# Patient Record
Sex: FEMALE | Race: BLACK OR AFRICAN AMERICAN
[De-identification: names, ages, dates, MRNs, and addresses within clinical notes are randomized per-mention and may not be internally consistent; named-entity substitution may affect disease eponyms.]

---

## 2018-10-10 ENCOUNTER — HOSPITAL ENCOUNTER (EMERGENCY)
Dept: HOSPITAL 87 - ER | Age: 23
LOS: 2 days | Discharge: HOME | End: 2018-10-12
Payer: MEDICAID

## 2018-10-10 VITALS — BODY MASS INDEX: 31.59 KG/M2 | HEIGHT: 65 IN | WEIGHT: 189.6 LBS

## 2018-10-10 DIAGNOSIS — R45.851: ICD-10-CM

## 2018-10-10 DIAGNOSIS — F32.9: Primary | ICD-10-CM

## 2018-10-10 DIAGNOSIS — R74.0: ICD-10-CM

## 2018-10-10 DIAGNOSIS — Z87.891: ICD-10-CM

## 2018-10-10 PROCEDURE — 99283 EMERGENCY DEPT VISIT LOW MDM: CPT

## 2018-10-10 PROCEDURE — 81025 URINE PREGNANCY TEST: CPT

## 2018-10-11 LAB
AMPHETAMINES UR QL SCN: NEGATIVE
APPEARANCE UR: (no result)
BARBITURATES UR QL SCN: NEGATIVE
BASOPHILS NFR BLD AUTO: 1 % (ref 0–2)
BENZODIAZ UR QL SCN: NEGATIVE
BZE UR QL SCN: NEGATIVE
CANNABINOIDS UR QL SCN: NEGATIVE
CHLORIDE SERPL-SCNC: 105 MEQ/L (ref 98–107)
COLOR UR: YELLOW
EOSINOPHIL NFR BLD AUTO: 1.8 % (ref 0–5)
ERYTHROCYTE [DISTWIDTH] IN BLOOD BY AUTOMATED COUNT: 14.1 % (ref 11.6–14.6)
ETHANOL SERPL-MCNC: < 10 MG/DL
HCG SERPL QL: NEGATIVE
HCT VFR BLD AUTO: 36.9 % (ref 36–48)
HGB BLD-MCNC: 12.7 G/DL (ref 12–16)
HGB UR QL STRIP: (no result)
KETONES UR STRIP-MCNC: NEGATIVE MG/DL
LEUKOCYTE ESTERASE UR QL STRIP: (no result)
LYMPHOCYTES NFR BLD AUTO: 38.1 % (ref 20–50)
MCH RBC QN AUTO: 29.4 PG (ref 28–32)
MCV RBC AUTO: 85.4 FL (ref 81–99)
METHADONE UR QL SCN: NEGATIVE
MONOCYTES NFR BLD AUTO: 7.5 % (ref 2–8)
NEUTROPHILS NFR BLD AUTO: 51.6 % (ref 40–76)
NITRITE UR QL STRIP: NEGATIVE
OPIATES UR QL SCN: NEGATIVE
PCP UR QL SCN: NEGATIVE
PH UR STRIP: 7.5 [PH] (ref 4.5–8)
PLATELET # BLD AUTO: 328 X1000/UL (ref 130–400)
PMV BLD AUTO: 8.1 FL (ref 7.4–10.4)
PROT UR QL STRIP: NEGATIVE
RBC # BLD AUTO: 4.32 MILL/UL (ref 4.2–5.4)
SP GR UR STRIP: 1 (ref 1–1.03)
UROBILINOGEN UR STRIP-MCNC: 0.2 E.U./DL (ref 0.2–1)

## 2018-10-11 RX ADMIN — NITROFURANTOIN MONOHYDRATE AND NITROFURANTOIN MACROCRYSTALLINE SCH MG: 75; 25 CAPSULE ORAL at 21:00

## 2018-10-12 VITALS — DIASTOLIC BLOOD PRESSURE: 72 MMHG | SYSTOLIC BLOOD PRESSURE: 124 MMHG

## 2018-10-12 RX ADMIN — NITROFURANTOIN MONOHYDRATE AND NITROFURANTOIN MACROCRYSTALLINE SCH MG: 75; 25 CAPSULE ORAL at 09:36

## 2018-10-31 ENCOUNTER — HOSPITAL ENCOUNTER (EMERGENCY)
Dept: HOSPITAL 12 - ER | Age: 23
Discharge: HOME | End: 2018-10-31
Payer: COMMERCIAL

## 2018-10-31 VITALS — HEIGHT: 65 IN | WEIGHT: 200 LBS | BODY MASS INDEX: 33.32 KG/M2

## 2018-10-31 VITALS — SYSTOLIC BLOOD PRESSURE: 121 MMHG | DIASTOLIC BLOOD PRESSURE: 71 MMHG

## 2018-10-31 DIAGNOSIS — F20.9: Primary | ICD-10-CM

## 2018-10-31 DIAGNOSIS — F17.200: ICD-10-CM

## 2018-10-31 DIAGNOSIS — Z59.0: ICD-10-CM

## 2018-10-31 PROCEDURE — A4663 DIALYSIS BLOOD PRESSURE CUFF: HCPCS

## 2018-10-31 SDOH — ECONOMIC STABILITY - HOUSING INSECURITY: HOMELESSNESS: Z59.0

## 2018-10-31 NOTE — NUR
Pt ambulates to ER accompanied by [employer] with c/o chronic rectal pain.  [id number].  
Pt states she hears voices but the voices tell her jokes.  Denies SI/HI.  VSS.

## 2018-10-31 NOTE — NUR
Patient discharged to home in stable conditon.  Written and verbal after care 
instructions given. 

Patient verbalizes understanding of instructions.  Pt ambulates out of ER in 
steady gait.  All belongings with pt.  NAD noted. VSS.

## 2018-11-16 ENCOUNTER — HOSPITAL ENCOUNTER (EMERGENCY)
Dept: HOSPITAL 91 - E/R | Age: 23
Discharge: LEFT BEFORE DISCHARGE | End: 2018-11-16
Payer: SELF-PAY

## 2018-11-16 ENCOUNTER — HOSPITAL ENCOUNTER (EMERGENCY)
Age: 23
Discharge: LEFT BEFORE DISCHARGE | End: 2018-11-16

## 2018-11-16 DIAGNOSIS — Z53.21: Primary | ICD-10-CM

## 2019-01-05 ENCOUNTER — HOSPITAL ENCOUNTER (EMERGENCY)
Dept: HOSPITAL 91 - E/R | Age: 24
Discharge: LEFT BEFORE DISCHARGE | End: 2019-01-05
Payer: SELF-PAY

## 2019-01-05 ENCOUNTER — HOSPITAL ENCOUNTER (EMERGENCY)
Dept: HOSPITAL 91 - E/R | Age: 24
Discharge: HOME | End: 2019-01-05
Payer: COMMERCIAL

## 2019-01-05 DIAGNOSIS — R40.2362: ICD-10-CM

## 2019-01-05 DIAGNOSIS — Z53.21: Primary | ICD-10-CM

## 2019-01-05 DIAGNOSIS — R40.2252: ICD-10-CM

## 2019-01-05 DIAGNOSIS — R40.2142: ICD-10-CM

## 2019-01-05 DIAGNOSIS — R19.7: Primary | ICD-10-CM

## 2019-01-05 LAB
ACETAMINOPHEN: < 10 UG/ML (ref 10–30)
ADD MAN DIFF?: NO
ALANINE AMINOTRANSFERASE: 45 IU/L (ref 13–69)
ALBUMIN/GLOBULIN RATIO: 1.17
ALBUMIN: 4.1 G/DL (ref 3.3–4.9)
ALKALINE PHOSPHATASE: 75 IU/L (ref 42–121)
ANION GAP: 10 (ref 5–13)
ASPARTATE AMINO TRANSFERASE: 51 IU/L (ref 15–46)
BASOPHIL #: 0 10^3/UL (ref 0–0.1)
BASOPHILS %: 0.1 % (ref 0–2)
BILIRUBIN,DIRECT: 0 MG/DL (ref 0–0.2)
BILIRUBIN,TOTAL: 0 MG/DL (ref 0.2–1.3)
BLOOD UREA NITROGEN: 9 MG/DL (ref 7–20)
CALCIUM: 9.2 MG/DL (ref 8.4–10.2)
CARBON DIOXIDE: 27 MMOL/L (ref 21–31)
CHLORIDE: 108 MMOL/L (ref 97–110)
CREATININE: 0.51 MG/DL (ref 0.44–1)
EOSINOPHILS #: 0.2 10^3/UL (ref 0–0.5)
EOSINOPHILS %: 1.6 % (ref 0–7)
ETHANOL: < 10 MG/DL (ref 0–0)
GLOBULIN: 3.5 G/DL (ref 1.3–3.2)
GLUCOSE: 99 MG/DL (ref 70–220)
HEMATOCRIT: 33.5 % (ref 37–47)
HEMOGLOBIN: 10.8 G/DL (ref 12–16)
IMMATURE GRANS #M: 0.04 10^3/UL (ref 0–0.03)
IMMATURE GRANS % (M): 0.3 % (ref 0–0.43)
LYMPHOCYTES #: 2.5 10^3/UL (ref 0.8–2.9)
LYMPHOCYTES %: 17.8 % (ref 15–51)
MEAN CORPUSCULAR HEMOGLOBIN: 27.7 PG (ref 29–33)
MEAN CORPUSCULAR HGB CONC: 32.2 G/DL (ref 32–37)
MEAN CORPUSCULAR VOLUME: 85.9 FL (ref 82–101)
MEAN PLATELET VOLUME: 8.8 FL (ref 7.4–10.4)
MONOCYTE #: 0.8 10^3/UL (ref 0.3–0.9)
MONOCYTES %: 5.8 % (ref 0–11)
NEUTROPHIL #: 10.4 10^3/UL (ref 1.6–7.5)
NEUTROPHILS %: 74.4 % (ref 39–77)
NUCLEATED RED BLOOD CELLS #: 0 10^3/UL (ref 0–0)
NUCLEATED RED BLOOD CELLS%: 0 /100WBC (ref 0–0)
PLATELET COUNT: 427 10^3/UL (ref 140–415)
POTASSIUM: 3.8 MMOL/L (ref 3.5–5.1)
RED BLOOD COUNT: 3.9 10^6/UL (ref 4.2–5.4)
RED CELL DISTRIBUTION WIDTH: 13.2 % (ref 11.5–14.5)
SALICYLATE: < 1 MG/DL (ref 5–30)
SODIUM: 141 MMOL/L (ref 135–144)
TOTAL PROTEIN: 7.6 G/DL (ref 6.1–8.1)
WHITE BLOOD COUNT: 14 10^3/UL (ref 4.8–10.8)

## 2019-01-05 PROCEDURE — 80307 DRUG TEST PRSMV CHEM ANLYZR: CPT

## 2019-01-05 PROCEDURE — 85025 COMPLETE CBC W/AUTO DIFF WBC: CPT

## 2019-01-05 PROCEDURE — 99284 EMERGENCY DEPT VISIT MOD MDM: CPT

## 2019-01-05 PROCEDURE — 80053 COMPREHEN METABOLIC PANEL: CPT

## 2019-01-05 RX ADMIN — THIAMINE HYDROCHLORIDE 1 MLS/HR: 100 INJECTION, SOLUTION INTRAMUSCULAR; INTRAVENOUS at 06:15

## 2019-01-05 RX ADMIN — CIPROFLOXACIN HYDROCHLORIDE 1 MG: 500 TABLET, FILM COATED ORAL at 07:41

## 2021-07-09 ENCOUNTER — HOSPITAL ENCOUNTER (EMERGENCY)
Dept: HOSPITAL 54 - ER | Age: 26
Discharge: HOME | End: 2021-07-09
Payer: COMMERCIAL

## 2021-07-09 VITALS — DIASTOLIC BLOOD PRESSURE: 73 MMHG | SYSTOLIC BLOOD PRESSURE: 124 MMHG

## 2021-07-09 VITALS — BODY MASS INDEX: 23.95 KG/M2 | WEIGHT: 149 LBS | HEIGHT: 66 IN

## 2021-07-09 DIAGNOSIS — Z76.0: ICD-10-CM

## 2021-07-09 DIAGNOSIS — F20.0: ICD-10-CM

## 2021-07-09 DIAGNOSIS — F31.9: Primary | ICD-10-CM

## 2021-07-09 RX ADMIN — QUETIAPINE FUMARATE ONE MG: 100 TABLET ORAL at 02:30

## 2021-07-09 RX ADMIN — QUETIAPINE FUMARATE SCH MG: 100 TABLET ORAL at 02:30

## 2021-07-09 RX ADMIN — CITALOPRAM ONE MG: 20 TABLET ORAL at 02:30

## 2021-07-09 RX ADMIN — CITALOPRAM SCH MG: 20 TABLET ORAL at 02:30
